# Patient Record
Sex: MALE | Race: WHITE
[De-identification: names, ages, dates, MRNs, and addresses within clinical notes are randomized per-mention and may not be internally consistent; named-entity substitution may affect disease eponyms.]

---

## 2018-11-29 ENCOUNTER — HOSPITAL ENCOUNTER (OUTPATIENT)
Dept: HOSPITAL 61 - US | Age: 58
Discharge: HOME | End: 2018-11-29
Attending: INTERNAL MEDICINE
Payer: COMMERCIAL

## 2018-11-29 DIAGNOSIS — I73.9: Primary | ICD-10-CM

## 2018-11-29 DIAGNOSIS — I87.2: ICD-10-CM

## 2018-11-29 PROCEDURE — 93970 EXTREMITY STUDY: CPT

## 2018-11-29 PROCEDURE — 93925 LOWER EXTREMITY STUDY: CPT

## 2018-11-29 NOTE — RAD
MR#: [id number]

Account#: [id number]

Accession#: [id number]

Date of Study: 11/29/2018

Ordering Physician: ROBYN COATES, 

Referring Physician: ROBYN COATES 

Tech: Kristen Zavala RDMS RVT





--------------- APPROVED REPORT --------------





Patient Location: OUT-PATIENT



Risk Factors

History of Lower Extremity PAD: Bilaterally              



VELOCITY AND DOPPLER WAVEFORM ANALYSIS

RIGHT cm/secWaveformSeverity LEFT cm/secWaveform Severity 

pCFA 108.5TriphasicpCFA 89.7Triphasic

Prof Fem Art. 82.0TriphasicProf Fem Art. 68.0Triphasic

Fem Art Prox. 83.6TriphasicFem Art Prox. 111.3Triphasic

Fem Art Mid. 68.9TriphasicFem Art Mid. 80.3Triphasic

Fem Art Dist. 87.8TriphasicFem Art Dist. 101.2Triphasic

Pop Art(Fossa) 61.0TriphasicPop Art(AK) 81.9Triphasic

PTA Dist. 71.5TriphasicPTA Dist. 45.3Monophasic      

Per Art Dist.82.0TriphasicPer Art Dist.101.0Triphasic

MARYCARMEN Dist. 72.0TriphasicATA Dist. 77.3Triphasic

DPA 75TriphasicDPA 60Triphasic



Findings

Gray scale images of the bilatera lower extremity arterial vessels reveals mild luminal atheroscleros
is without significant obstruction. 



Color doppler and spectral waveforms in the right and left common, SFA, Profunda, Popliteal, AT/Peron
eal vessels are within normal limits. Left posterior tibial artery velocity is mildly reduced in a mo
nophasic wave pattern suggestive of diffuse disease. 



Critical Notification

Critical Value: No



<Conclusion>

No significant disease in the bilateral lower extremity arterial vessels. 



Signed by : Robyn Coates, 

Electronically Approved : 11/29/2018 12:57:22

## 2018-11-29 NOTE — RAD
MR#: [id number]

Account#: [id number]

Accession#: [id number]

Date of Study: 11/29/2018

Ordering Physician: ROBYN COATES, 

Referring Physician: ROBYN COATES, 

Tech: 





--------------- APPROVED REPORT --------------





Findings

Bilateral common femoral veins appear to be compressible without any obvious evidence of thrombus.

Due to body habitus the superficial femoral and popliteal veins were not well visualized. The below-k
nee veins are also not well visualized. Technically severely difficult study.



Critical Notification

Critical Value: No



<Conclusion>

No obvious common femoral vein thrombosis. Technically very limited study. No obvious evidence of thr
ombus within these limitations.



Signed by : Robyn Coates, 

Electronically Approved : 11/29/2018 14:48:37

## 2018-11-29 NOTE — RAD
MR#: [id number]

Account#: [id number]

Accession#: [id number]

Date of Study: 11/29/2018

Ordering Physician: ROBYN COATES, 

Referring Physician: ROBYN COATES, 

Tech: Kristen Zavala RDMS RVT





--------------- APPROVED REPORT --------------





Patient Location : OUT-PATIENT



Indications

VENOUS INSUFFICIENCY



Findings

Grayscale images the bilateral saphenofemoral junctions do not reveal any obvious evidence of thrombu
s on limited imaging.



The right great saphenous vein measures proximally 6 mm and the left great saphenous vein measures ap
proximately 7 mm. The bilateral greater saphenous veins have reflux up to 2.5 seconds.



The bilateral lesser saphenous veins are positive for reflux at approximately 1.5 seconds. The right 
lesser saphenous vein measures 5 mm and the left lesser saphenous vein measures 7 mm.



Critical Notification

Critical Value: No



<Conclusion>

1. Reflux in the bilateral greater and lesser saphenous veins.



Signed by : Robyn Coates, 

Electronically Approved : 11/29/2018 14:36:21

## 2019-08-16 ENCOUNTER — HOSPITAL ENCOUNTER (OUTPATIENT)
Dept: HOSPITAL 61 - CT | Age: 59
Discharge: HOME | End: 2019-08-16
Attending: INTERNAL MEDICINE
Payer: COMMERCIAL

## 2019-08-16 DIAGNOSIS — M79.89: ICD-10-CM

## 2019-08-16 DIAGNOSIS — K44.9: Primary | ICD-10-CM

## 2019-08-16 DIAGNOSIS — I87.2: ICD-10-CM

## 2019-08-16 DIAGNOSIS — I51.7: ICD-10-CM

## 2019-08-16 DIAGNOSIS — J84.10: ICD-10-CM

## 2019-08-16 DIAGNOSIS — N32.89: ICD-10-CM

## 2019-08-16 DIAGNOSIS — M77.8: ICD-10-CM

## 2019-08-16 DIAGNOSIS — I70.0: ICD-10-CM

## 2019-08-16 LAB
CREAT SERPL-MCNC: 1.2 MG/DL (ref 0.7–1.3)
GFR SERPLBLD BASED ON 1.73 SQ M-ARVRAT: 62.6 ML/MIN

## 2019-08-16 PROCEDURE — 74177 CT ABD & PELVIS W/CONTRAST: CPT

## 2019-08-16 PROCEDURE — 82565 ASSAY OF CREATININE: CPT

## 2019-08-16 PROCEDURE — C8929 TTE W OR WO FOL WCON,DOPPLER: HCPCS

## 2019-08-16 PROCEDURE — 36415 COLL VENOUS BLD VENIPUNCTURE: CPT

## 2019-08-16 NOTE — CARD
MR#: [id number]

Account#: [id number]

Accession#: [id number]

Date of Study: 08/16/2019

Ordering Physician: ROBYN COATES, 

Referring Physician: ROBYN COATES, 

Tech: Zari Rodriguez





--------------- APPROVED REPORT --------------





EXAM: Two-dimensional and M-mode echocardiogram with Doppler and color Doppler.



Other Information 

Quality : FairHR: 70bpm



INDICATION

Hypertension/HCVD



Echo Enhancing Agent

Indication: Endocardial border delineation

Agent/Amount Used: Optison 10mL



RISK FACTORS

Diabetes



2D DIMENSIONS 

Left Atrium(2D)3.4 (1.6-4.0cm)IVSd1.1 (0.7-1.1cm)

Aortic Root(2D)3.5 (2.0-3.7cm)LVDd6.3 (3.9-5.9cm)

LVOT Diameter2.2 (1.8-2.4cm)PWd1.2 (0.7-1.1cm)

LVDs3.3 (2.5-4.0cm)FS (%) 47.8 %

.9 ml



Aortic Valve

AoV Peak Jeremiah.115.4cm/sAoV VTI20.6cm

AO Peak GR.5.3mmHgLVOT Peak Jeremiah.97.2cm/s

LVOT  VTI 21.89cmAO Mean GR.3mmHg

DOMO (VMAX)2.06kz7ERB   (VTI)4.17cm2



Mitral Valve

MV E Ymmcsmtm22.0cm/sMV DECEL [id number]

MV A Esidgoyw70.0cm/sMV DJW13lo

E/A  Ratio1.5MVA (PHT)3.52cm2



TDI

E/Lateral E'5.0E/Medial E'5.5



Pulmonary Valve

PV Peak Jriuniwo78.9cm/sPV Peak Grad.3mmHg



Tricuspid Valve

TR P. Ekcgginz457jv/sRAP ZDOOOIOJ3irDt

TR Peak Gr.26ytEzXVBF32yfZw



Pulmonary Vein

S1 Hwqewhfr96.7cm/sD2 Xpslkaih39.6cm/s

PVa wlvfztiy019qadz



 LEFT VENTRICLE 

The Left Ventricle is mildly dilated. There is mild concentric left ventricular hypertrophy. The left
 ventricular systolic function is normal and the ejection fraction is within normal range. The Ejecti
on Fraction is 55-60%. There is normal LV segmental wall motion. The left ventricular diastolic funct
ion and filling is normal for age.



 RIGHT VENTRICLE 

The right ventricle is borderline dilated. There is normal right ventricular wall thickness. The righ
t ventricular systolic function is normal.



 ATRIA 

The left atrium is borderline dilated. The right atrium is mildly dilated. The interatrial septum is 
intact with no evidence for an atrial septal defect or patent foramen ovale as noted on 2-D or Dopple
r imaging.



 AORTIC VALVE 

The aortic valve is normal in structure and function. Doppler and Color Flow revealed no significant 
aortic regurgitation. There is no significant aortic valvular stenosis.



 MITRAL VALVE 

The mitral valve is normal in structure and function. There is no evidence of mitral valve prolapse. 
There is no mitral valve stenosis. Doppler and Color-flow revealed trace mitral regurgitation.



 TRICUSPID VALVE 

The tricuspid valve is normal in structure and function. Doppler and Color Flow revealed trace tricus
pid regurgitation with an estimated PAP of 40 mmHg. There is no tricuspid valve stenosis.



 PULMONIC VALVE 

The pulmonary valve is normal in structure and function. Doppler and Color Flow revealed no pulmonic 
valvular regurgitation.



 GREAT VESSELS 

The aortic root is normal in size. The IVC is normal in size and collapses >50% with inspiration.



 PERICARDIAL EFFUSION 

There is no evidence of significant pericardial effusion.



Critical Notification

Critical Value: No



<Conclusion>

The Left Ventricle is mildly dilated.

The left ventricular systolic function is normal and the ejection fraction is within normal range.

The Ejection Fraction is 55-60%.

There is mild concentric left ventricular hypertrophy.

There is no significant aortic valvular stenosis.

Doppler and Color Flow revealed no significant aortic regurgitation.

Doppler and Color-flow revealed trace mitral regurgitation.

Doppler and Color Flow revealed trace tricuspid regurgitation with an estimated PAP of 40 mmHg.



Signed by : Ra Hoffman MD

Electronically Approved : 08/16/2019 16:18:34

## 2019-08-16 NOTE — RAD
EXAM: CT Abdomen and Pelvis with bilateral lower extremity runoff with IV 

contrast

 

CLINICAL HISTORY: Lower extremity swelling, pain Evaluate for DVT, 

evaluate for May Thurner syndrome. 

 

COMPARISON: none

 

TECHNIQUE: Helical CT of the abdomen and pelvis was performed following 

the administration of intravenous contrast with runoff evaluation of the 

lower extremities during the venous phase. Axial, coronal and sagittal 

reformatted images were generated.

 

PQRS compliance statement - One or more of the following individualized 

dose reduction techniques were utilized for this study:

1.  Automated exposure control

2.  Adjustment of the mA and/or kV according to patient size

3.  Use of iterative reconstruction technique

 

FINDINGS:

Note, suboptimal contrast bolus timing results in limited opacification of

the vessels and solid organs.

 

Lower chest: 

Lung bases are clear. Left hilar calcified granuloma. Small to 

moderate-sized hiatal hernia.

 

Abdomen and Pelvis: 

No focal liver lesion. Gallbladder is normal. No biliary ductal 

dilatation. Spleen is unremarkable.

 

Adrenal glands and pancreas are unremarkable.

 

Symmetric nephrograms.  No focal renal lesion. No hydronephrosis.  Bladder

is only partially distended but otherwise unremarkable.

 

Appendix is normal.  No small or large bowel dilatation. Moderate colonic 

stool content. 

 

No abdominal or pelvic lymphadenopathy. No abdominal or pelvic ascites. 

 

Intermittent atherosclerotic calcifications of aorta are seen. There is no

significant narrowing of the iliac veins bilaterally iliac arteries to 

suggest May Thurner syndrome. However limited evaluation given suboptimal 

vascular opacification.

 

Bilateral lower extremities:

Post surgical changes of the right total tuberosity. Subcutaneous soft 

tissue swelling particularly of the lower legs with subcutaneous 

calcifications, right greater than left. Again, evaluation for DVT is 

limited given suboptimal contrast bolus timing. No evidence for acute 

fracture lower extremities. Calcaneal enthesopathy. Chondrocalcinosis 

symphysis pubis.

 

Multilevel degenerative changes of spine are seen. No evidence for acute 

fracture.

 

IMPRESSION: 

1.  Evaluation of the venous structures is limited given phase of IV 

contrast. Within these limitations, no definite morphologic changes to 

suggest May Thurner syndrome. Consider duplex ultrasound of the lower 

extremities to evaluate for lower extremity DVT.

 

Electronically signed by: Manny Rubio MD (8/16/2019 3:53 PM) [facility]

## 2020-02-06 ENCOUNTER — HOSPITAL ENCOUNTER (OUTPATIENT)
Dept: HOSPITAL 61 - US | Age: 60
Discharge: HOME | End: 2020-02-06
Attending: INTERNAL MEDICINE
Payer: COMMERCIAL

## 2020-02-06 DIAGNOSIS — Z86.718: ICD-10-CM

## 2020-02-06 DIAGNOSIS — I82.409: Primary | ICD-10-CM

## 2020-02-06 DIAGNOSIS — I87.2: ICD-10-CM

## 2020-02-06 DIAGNOSIS — E66.9: ICD-10-CM

## 2020-02-06 PROCEDURE — 93970 EXTREMITY STUDY: CPT

## 2020-02-06 NOTE — RAD
MR#: [id number]

Account#: [id number]

Accession#: [id number]

Date of Study: 02/06/2020

Ordering Physician: ROBYN COATES, 

Referring Physician: ROBYN COATES, 

Tech: DAREK Andrade, RDMS, RTR





--------------- APPROVED REPORT --------------





Patient Location : OUT-PATIENT



Indications

History of DVT

Skin Changes



Risk Factors

Obesity  



Findings

The right great saphenous vein measures approximately 10 mm and has a maximum reflux time of 1.6 seco
nds. The right lesser saphenous veins does not show any evidence of reflux. The left great saphenous 
vein measures 7 mm and has no evidence of reflux. The left lesser saphenous vein does not show any ev
idence of reflux.



Critical Notification

Critical Value: No



<Conclusion>

1. Positive for reflux in the right great saphenous vein.



Signed by : Robyn Coates, 

Electronically Approved : 02/06/2020 16:08:56

## 2020-02-06 NOTE — RAD
MR#: [id number]

Account#: [id number]

Accession#: [id number]

Date of Study: 02/06/2020

Ordering Physician: ROBYN COATES, 

Referring Physician: ROBYN COATES, 

Tech: DAREK Andrade, RDMS, Rvt





--------------- APPROVED REPORT --------------





Bilateral Lower Extremity Venous Study for DVT

Patient Location: OUT-PATIENT



Indications

edema, dvt, on blood thinners, rt leg wound



Risk Factors

Obesity                                                                                 



Findings

Technically very difficult study due to the patient's body habitus of significantly enlarged lower ex
tremities



The bilateral common femoral veins appear to be patent and compressible. Bilateral superficial femora
l veins are grossly patent and compressible





The distal superficial femoral vein in the popliteal segment bilaterally are not well visualized. Can
not rule out chronic thrombus.



Bilateral below-knee veins were not well visualized.



Critical Notification

Critical Value: No



<Conclusion>

1. Technically very difficult study.

Cannot rule out the bilateral saphenous popliteal chronic thrombus.



Signed by : Robyn Coates, 

Electronically Approved : 02/06/2020 16:03:14

## 2020-08-18 ENCOUNTER — HOSPITAL ENCOUNTER (OUTPATIENT)
Dept: HOSPITAL 61 - VNUS | Age: 60
Discharge: HOME | End: 2020-08-18
Attending: INTERNAL MEDICINE
Payer: COMMERCIAL

## 2020-08-18 DIAGNOSIS — I83.891: ICD-10-CM

## 2020-08-18 DIAGNOSIS — I87.2: Primary | ICD-10-CM

## 2020-08-18 PROCEDURE — 36475 ENDOVENOUS RF 1ST VEIN: CPT

## 2020-08-18 NOTE — CARD
MR#: [id number]

Account#: [id number]

Accession#: [id number]

Date of Study: 08/18/2020

Ordering Physician: DION HECTOR, 

Referring Physician: DION HECTOR, 

Tech: Zari Wright RVT; Vivek TAYLOR;ERICKSON





--------------- APPROVED REPORT --------------





Patient StatusOUT-PATIENT

Cardiac Technologist:      Zari Wright RVT; Vivek TAYLOR;ERICKSON

Procedure(s) performed: Endovenous radiofrequency ablation of the Right greater saphenous vein.



INDICATION FOR PROCEDURE

The indication(s) include : Symptomatic Chronic Venous Insufficiency with Varicose Veins, lower extre
mity pain and edema.



PROCEDURE NARRATIVE

The patient was transferred to the procedure suite and the insufficient saphenous vein was mapped by 
ultrasound and diagrammed on the underlying skin.  The depth and diameter of the vein(s) to be treate
d was documented.  The varicose tributary veins and suitable access sites were identified and mapped 
as well.  The patient was then positioned supine on the procedure table.  The entire limb was sterile
ly prepared and the lower extremity and treatment table were sterilely draped.   The RF catheter was 
placed on the sterile field, flushed and wiped down, prepared, and connected by a sterile cable.



The patient was placed in reverse-Trendelenburg position and local anesthesia was instilled in the sk
in overlying the access site.  A skin incision was made overlying the identified and mapped greater s
aphenous vein entry site.  The vein was punctured through the incision and using ultrasound guidance 
and the Seldinger technique a guide wire was introduced through the needle which was then exchanged o
linda the guide wire for a 7 F sheath.  The guide wire was removed and the sheath was flushed.  The RF 
probe was placed into the vein through the sheath and positioned at a point just distal (about 0.5 to
 1 cm) to the entrance point of the superficial epigastric artery using ultrasound guidance. 



After the RF probe position was verified by the ultrasound, tumescent anesthesia was infiltrated, und
er ultrasound guidance, precisely into the perivenuus compartment along the entire length of vein fro
m the entry site to the saphenofemoral junction until a "halo" of fluid was noted around the vein.



The patient was then placed in Trendelenburg position.  After the RF probe position was again confirm
ed with ultrasound imaging, moderate external compression was applied over the RF heating element, an
d RF energy was applied.  The probe was withdrawn sequentially in 6.5 cm steps with slight overlap of
 7 cm segments of ablation and monitored to keep the probe temperature at 120 degrees Celsius and the
 generator output well below its maximum power.  Treatment Segments: 10   Total Length: 52 cm.    Tot
al Ablation time: 3 minutes 20 secs.



Repeat ultrasound of the saphenous vein was performed confirming successful treatment.  The catheter 
and sheath were withdrawn and hemostasis established with direct pressure.  After assuring hemostasis
, the skin incision over the saphenous vein was closed with a bandage and an external compression fiorella
ssing was applied from the level of the foot to the most proximal level of the thigh.



Signed by : Dion Hector, 

Electronically Approved : 08/18/2020 14:58:19

## 2020-08-19 ENCOUNTER — HOSPITAL ENCOUNTER (OUTPATIENT)
Dept: HOSPITAL 61 - US | Age: 60
Discharge: HOME | End: 2020-08-19
Attending: INTERNAL MEDICINE
Payer: COMMERCIAL

## 2020-08-19 DIAGNOSIS — I87.2: ICD-10-CM

## 2020-08-19 DIAGNOSIS — I82.811: Primary | ICD-10-CM

## 2020-08-19 PROCEDURE — 93971 EXTREMITY STUDY: CPT

## 2020-08-19 NOTE — RAD
MR#: [id number]

Account#: [id number]

Accession#: [id number]

Date of Study: 08/19/2020

Ordering Physician: ROBYN COATES, 

Referring Physician: ROBYN COATES, 

Tech: Rip Varghese MBA, RDMS, RVT, RDCS, RTR





--------------- APPROVED REPORT --------------





Bilateral Lower Extremity Venous Study for DVT

Patient Location: OUT-PATIENT



Indications

S/P RT GSV VENASEAL



Vein Imaging (Right)

CFV (R): Compressible

SFJ (R): Compressible

FEM (R): Compressible

POP (R): Compressible

DFV (R): Compressible

GSV (R): Absent Flow



Doppler Evaluation (Right)

CFV (R): Spontaneous

POP (R):Spontaneous



Findings

Limited deep venous right lower extremity ultrasound due to body habitus but grossly no evidence of t
hrombus in the visualized common femoral veins to the popliteal veins.  Below-knee veins were not wel
l visualized.  The greater saphenous vein is occluded consistent with recent venaseal ablation therap
y.



Critical Notification

Critical Value: No



<Conclusion>

1.  No right-sided DVT, status post successful right GSV ablation



Signed by : Robyn Coates, 

Electronically Approved : 08/19/2020 16:48:08